# Patient Record
(demographics unavailable — no encounter records)

---

## 2024-10-31 NOTE — HISTORY OF PRESENT ILLNESS
[FreeTextEntry1] : 80 yo male, healthy, recently diagnosed gastric ulcer incidental renal mass on cross sectional imaging. he is a swimmer, meditates. healthy no meds. 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 No renal vein involvement or lymphadenopathy. Recommend urology referral  ex-smoker: x 10 years PSgHx: thyroid surgery no blood thinners

## 2024-10-31 NOTE — ASSESSMENT
[FreeTextEntry1] : CT chest non-con r/o mets  Plan: robotic left partial nephrectomy  I had a lengthy and thorough discussion with Mr Solano. I detailed the management options which include surveillance, ablation, partial nephrectomy, or radical nephrectomy. I also discussed the role of laparoscopic, robotic, and open surgery. We reviewed the images showing the mass and I explained, based on the size, there is an approximately ***% chance that this mass contains cancer. We specifically discussed the risks, benefits, rationale, and implications of partial versus radical nephrectomy as well as laparoscopic versus open surgery.   Ablative therapy can be accomplished with cryotherapy or high-intensity focused ultrasound and is typically done laparoscopically. Risks include bleeding, urinary fistula, and higher rates of local recurrence.    Partial nephrectomy has been shown to have equivalent long-term cancer control compared to radical nephrectomy with favorable renal function outcomes. It is believed that improved renal function leads to a lower risk of subsequent heart disease, hospitalizations, and non-cancer mortality. Risks of partial nephrectomy include but are not limited to bleeding, infection, adjacent organ injury, urinary fistula, need for radical nephrectomy, positive surgical margin and standard operative risks such as DVT, PE, pneumonia, MI, stroke, and even a 0.2% chance of death.    Radical nephrectomy is complete removal of the kidney.    At the conclusion of our discussion, all questions were answered to the best of my ability.    I have recommended a robotic left partial nephrectomy